# Patient Record
Sex: MALE | Race: WHITE
[De-identification: names, ages, dates, MRNs, and addresses within clinical notes are randomized per-mention and may not be internally consistent; named-entity substitution may affect disease eponyms.]

---

## 2017-02-13 ENCOUNTER — HOSPITAL ENCOUNTER (OUTPATIENT)
Dept: HOSPITAL 17 - CLAB | Age: 62
End: 2017-02-13
Attending: FAMILY MEDICINE
Payer: COMMERCIAL

## 2017-02-13 DIAGNOSIS — E03.9: Primary | ICD-10-CM

## 2017-02-13 PROCEDURE — 36415 COLL VENOUS BLD VENIPUNCTURE: CPT

## 2017-02-13 PROCEDURE — 84443 ASSAY THYROID STIM HORMONE: CPT

## 2017-05-16 ENCOUNTER — HOSPITAL ENCOUNTER (EMERGENCY)
Dept: HOSPITAL 17 - NEPK | Age: 62
Discharge: HOME | End: 2017-05-16
Payer: COMMERCIAL

## 2017-05-16 VITALS — BODY MASS INDEX: 24.97 KG/M2 | HEIGHT: 70.5 IN | WEIGHT: 176.37 LBS

## 2017-05-16 VITALS
RESPIRATION RATE: 16 BRPM | TEMPERATURE: 98.8 F | HEART RATE: 81 BPM | OXYGEN SATURATION: 97 % | SYSTOLIC BLOOD PRESSURE: 162 MMHG | DIASTOLIC BLOOD PRESSURE: 95 MMHG

## 2017-05-16 DIAGNOSIS — Z23: ICD-10-CM
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
## 2017-05-16 DIAGNOSIS — Z87.442: ICD-10-CM

## 2017-05-16 DIAGNOSIS — E07.9: ICD-10-CM

## 2017-05-16 DIAGNOSIS — Z72.0: ICD-10-CM

## 2017-05-16 DIAGNOSIS — S91.011A: Primary | ICD-10-CM

## 2017-05-16 PROCEDURE — 12001 RPR S/N/AX/GEN/TRNK 2.5CM/<: CPT

## 2017-05-16 PROCEDURE — 90714 TD VACC NO PRESV 7 YRS+ IM: CPT

## 2017-05-16 PROCEDURE — 90471 IMMUNIZATION ADMIN: CPT

## 2017-05-16 NOTE — PD
Physical Exam


Time Seen by Provider:  11:33


Narrative


61 y/o male here with laceration to R ankle sustained by a broken piece of a 

porcelain toilet.  C/o minimal pain associated with laceration.  Bandaged at 

triage desk.  Ambulatory.








Vital signs reviewed.


Seen at triage desk.  Awaiting bed placement.





Data


Data


Last Documented VS





Vital Signs








  Date Time  Temp Pulse Resp B/P Pulse Ox O2 Delivery O2 Flow Rate FiO2


 


5/16/17 11:31 98.8 81 16 162/95 97 Room Air  











[facility]


Medical Record Reviewed:  Yes


Supervised Visit with KEMAR:  Howard Gutierrez May 16, 2017 11:35

## 2017-05-16 NOTE — PD
HPI


Chief Complaint:  Laceration/Skin Injury


Time Seen by Provider:  11:45


Travel History


International Travel<30 days:  No


Contact w/Intl Traveler<30days:  No


Traveled to known affect area:  No





History of Present Illness


HPI


62-year-old male presents emergency Department with complaint of a laceration 

to the lateral aspect of his right ankle from a piece of porcelain today.  

Unknown tetanus vaccination status.  Denies paresthesias, loss of sensation, 

decreased motion, decreasing to the affected joint.  Has not taken any 

medication to alleviate symptoms.  Has applied pressure and a bandage to 

control bleeding.  Has no other medical complaints.  No other modifying factors 

or associated signs and symptoms.





[facility]


Past Medical History


Arthritis:  Yes


Cancer:  No


Cardiovascular Problems:  Yes


Diminished Hearing:  No


Endocrine:  Yes


Gastrointestinal Disorders:  No


Genitourinary:  Yes


Implanted Vascular Access Dvce:  No


Kidney Stones:  Yes


Musculoskeletal:  Yes


Neurologic:  Yes


Psychiatric:  No


Reproductive:  No


Respiratory:  No


Immunizations Current:  No


Thyroid Disease:  Yes


Tetanus Vaccination:  Unknown


Influenza Vaccination:  No





Past Surgical History


Abdominal Surgery:  Yes (APPENDECTOMY)


Appendectomy:  Yes


Other Surgery:  Yes





Social History


Alcohol Use:  Yes (WEEKLY, BEER)


Tobacco Use:  Yes (RARELY)


Substance Use:  No





Allergies-Medications


(Allergen,Severity, Reaction):  


Coded Allergies:  


     No Known Allergies (Unverified , 3/9/17)


Reported Meds & Prescriptions





Reported Meds & Active Scripts


Active


Amlodipine (Amlodipine Besylate) 10 Mg Tab 10 Mg PO DAILY


Levothyroxine (Levothyroxine Sodium) 25 Mcg Tab 25 Mcg PO DAILY


Atorvastatin (Atorvastatin Calcium) 40 Mg Tab 40 Mg PO HS


Levothyroxine (Levothyroxine Sodium) 200 Mcg Tab 200 Mcg PO DAILY








Review of Systems


Except as stated in HPI:  all other systems reviewed are Neg





Physical Exam


Narrative


GENERAL: Well-nourished, well-developed  male patient, in no acute 

distress


SKIN: Warm and dry.  Approximately 1-1/2; laceration to the lateral aspect of 

the right ankle; without erythema, edema; with minimal amount bright red 

drainage.  Right lower 20 supple and non-tense with 2+ pedal pulse and sensory 

intact without erythema or edema.


HEAD: Atraumatic. Normocephalic. 


EYES: Pupils equal and round. No scleral icterus. No injection or drainage.


ENT: Mucosa pink and moist.  Airway patent.  


NECK: Trachea midline.  


CARDIOVASCULAR: Regular rate. 


RESPIRATORY: No accessory muscle use.


GASTROINTESTINAL: Flat.  


MUSCULOSKELETAL:  No obvious deformities. No clubbing.  No cyanosis.  No edema. 


NEUROLOGICAL: Awake and alert.  Oriented 3.  No obvious cranial nerve 

deficits.  Motor grossly within normal limits. Normal speech. 


PSYCHIATRIC: Appropriate mood and affect; insight and judgment normal.





Data


Data


Last Documented VS





Vital Signs








  Date Time  Temp Pulse Resp B/P Pulse Ox O2 Delivery O2 Flow Rate FiO2


 


5/16/17 11:31 98.8 81 16 162/95 97 Room Air  








Orders





 Lidocai-Epi 1%-1:100,000 Inj (Xylocaine- (5/16/17 11:45)


Tetanus/Diphtheria Tox Adult (Tetanus/Di (5/16/17 12:00)








MDM


Medical Decision Making


Medical Screen Exam Complete:  Yes


Emergency Medical Condition:  Yes


Medical Record Reviewed:  Yes


Differential Diagnosis


Laceration, contusion, abrasion


Narrative Course


62-year-old male with laceration to his right ankle.  Medical records reviewed 

and do not find tetanus status.  Tetanus updated in the ER.  My procedure note 

for laceration repair.  Ibuprofen prescribed for home.  Patient verbalizes 

understanding and agreement with treatment plan.  Patient is medically cleared 

and stable for discharge.  Discussed reasons to return to the emergency 

department.  Instructed patient to follow up with primary care provider.  

Patient agrees with treatment plan.  The patients vital signs are stable and 

the patient is stable for outpatient follow-up and treatment.  Patient 

discharged home, stable and in no acute distress.





Procedures


**Procedure Narrative**


LACERATION


LOCATION: Right lateral ankle


LENGTH: 1-1/2 cm


NUMBER OF STITCHES/STAPLES: 2 staples





REPAIR: The area of the laceration was prepped with Betadine and sterilely 

draped.  The laceration was infiltrated with  


1% lidocaine with epinephrine.  The wound was copiously irrigated and explored 

without evidence of foreign body, tendon injury or neurovascular  


injury.  The wound was closed using staples. This was a single layer repair. A 

sterile dressing was applied. The patient was  


advised to keep the dressing clean and dry. Patient tolerated the procedure 

well.





Diagnosis





 Primary Impression:  


 Laceration of ankle


 Qualified Code:  S91.011A - Laceration of ankle, right, initial encounter


Referrals:  


Primary Care Physician


Patient Instructions:  Acute Wound Care (ED), General Instructions, Laceration (

ED), Staple Care (ED)


Departure Forms:  Tests/Procedures, Work Release   Enter return to work date:  

May 17, 2017





***Additional Instructions:


Keep area clean and dry


Ibuprofen or Tylenol as directed and as needed for pain and inflammation


Ice pack to area as needed to decrease pain


Return to the emergency department or follow-up with primary care provider in 10

-14 days for staple removal


Follow up with primary care provider 


Return to the emergency department immediately with worsening of symptoms, 

particularly if reddened streaks up or down the affected extremity from the 

suture site, fever, numbness/tingling in the affected extremity, loss of 

sensation in the affected extremity, severe swelling of the affected


***Med/Other Pt SpecificInfo:  Prescription(s) given


Disposition:  01 DISCHARGE HOME


Condition:  Stable








Keyla Salinas May 16, 2017 11:54

## 2017-06-09 ENCOUNTER — HOSPITAL ENCOUNTER (OUTPATIENT)
Dept: HOSPITAL 17 - CLAB | Age: 62
End: 2017-06-09
Attending: FAMILY MEDICINE
Payer: COMMERCIAL

## 2017-06-09 DIAGNOSIS — E03.9: Primary | ICD-10-CM

## 2017-06-09 DIAGNOSIS — E78.5: ICD-10-CM

## 2017-06-09 DIAGNOSIS — I10: ICD-10-CM

## 2017-06-09 DIAGNOSIS — Z72.0: ICD-10-CM

## 2017-06-09 LAB
ALP SERPL-CCNC: 84 U/L (ref 45–117)
ALT SERPL-CCNC: 24 U/L (ref 12–78)
ANION GAP SERPL CALC-SCNC: 9 MEQ/L (ref 5–15)
AST SERPL-CCNC: 22 U/L (ref 15–37)
BASOPHILS # BLD AUTO: 0.1 TH/MM3 (ref 0–0.2)
BASOPHILS NFR BLD: 1.1 % (ref 0–2)
BILIRUB SERPL-MCNC: 0.3 MG/DL (ref 0.2–1)
BUN SERPL-MCNC: 21 MG/DL (ref 7–18)
CHLORIDE SERPL-SCNC: 109 MEQ/L (ref 98–107)
EOSINOPHIL # BLD: 0.3 TH/MM3 (ref 0–0.4)
EOSINOPHIL NFR BLD: 4.6 % (ref 0–4)
ERYTHROCYTE [DISTWIDTH] IN BLOOD BY AUTOMATED COUNT: 14.6 % (ref 11.6–17.2)
GFR SERPLBLD BASED ON 1.73 SQ M-ARVRAT: 32 ML/MIN (ref 89–?)
HCO3 BLD-SCNC: 21.7 MEQ/L (ref 21–32)
HCT VFR BLD CALC: 36.9 % (ref 39–51)
HDLC SERPL-MCNC: 36.1 MG/DL (ref 40–60)
HEMO FLAGS: (no result)
LDLC SERPL-MCNC: 38 MG/DL (ref 0–99)
LYMPHOCYTES # BLD AUTO: 0.6 TH/MM3 (ref 1–4.8)
LYMPHOCYTES NFR BLD AUTO: 11.7 % (ref 9–44)
MCH RBC QN AUTO: 29 PG (ref 27–34)
MCHC RBC AUTO-ENTMCNC: 33.2 % (ref 32–36)
MCV RBC AUTO: 87.3 FL (ref 80–100)
MONOCYTES NFR BLD: 12.1 % (ref 0–8)
NEUTROPHILS # BLD AUTO: 3.9 TH/MM3 (ref 1.8–7.7)
NEUTROPHILS NFR BLD AUTO: 70.5 % (ref 16–70)
PLATELET # BLD: 190 TH/MM3 (ref 150–450)
POTASSIUM SERPL-SCNC: 4.1 MEQ/L (ref 3.5–5.1)
RBC # BLD AUTO: 4.22 MIL/MM3 (ref 4.5–5.9)
SODIUM SERPL-SCNC: 140 MEQ/L (ref 136–145)
WBC # BLD AUTO: 5.5 TH/MM3 (ref 4–11)

## 2017-06-09 PROCEDURE — 85025 COMPLETE CBC W/AUTO DIFF WBC: CPT

## 2017-06-09 PROCEDURE — 80061 LIPID PANEL: CPT

## 2017-06-09 PROCEDURE — 84443 ASSAY THYROID STIM HORMONE: CPT

## 2017-06-09 PROCEDURE — 36415 COLL VENOUS BLD VENIPUNCTURE: CPT

## 2017-06-09 PROCEDURE — 80053 COMPREHEN METABOLIC PANEL: CPT

## 2017-06-15 ENCOUNTER — HOSPITAL ENCOUNTER (OUTPATIENT)
Dept: HOSPITAL 17 - CLAB | Age: 62
End: 2017-06-15
Attending: FAMILY MEDICINE
Payer: COMMERCIAL

## 2017-06-15 DIAGNOSIS — N19: Primary | ICD-10-CM

## 2017-06-15 LAB
ANION GAP SERPL CALC-SCNC: 10 MEQ/L (ref 5–15)
BUN SERPL-MCNC: 11 MG/DL (ref 7–18)
CHLORIDE SERPL-SCNC: 105 MEQ/L (ref 98–107)
GFR SERPLBLD BASED ON 1.73 SQ M-ARVRAT: 83 ML/MIN (ref 89–?)
HCO3 BLD-SCNC: 23.9 MEQ/L (ref 21–32)
POTASSIUM SERPL-SCNC: 3.9 MEQ/L (ref 3.5–5.1)
SODIUM SERPL-SCNC: 139 MEQ/L (ref 136–145)

## 2017-06-15 PROCEDURE — 80069 RENAL FUNCTION PANEL: CPT

## 2017-06-15 PROCEDURE — 36415 COLL VENOUS BLD VENIPUNCTURE: CPT

## 2017-07-17 ENCOUNTER — HOSPITAL ENCOUNTER (OUTPATIENT)
Dept: HOSPITAL 17 - CLAB | Age: 62
End: 2017-07-17
Attending: FAMILY MEDICINE
Payer: COMMERCIAL

## 2017-07-17 DIAGNOSIS — E03.9: Primary | ICD-10-CM

## 2017-07-17 PROCEDURE — 36415 COLL VENOUS BLD VENIPUNCTURE: CPT

## 2017-07-17 PROCEDURE — 84443 ASSAY THYROID STIM HORMONE: CPT

## 2018-05-11 ENCOUNTER — HOSPITAL ENCOUNTER (EMERGENCY)
Dept: HOSPITAL 17 - NEDAMB | Age: 63
Discharge: HOME | End: 2018-05-11
Payer: COMMERCIAL

## 2018-05-11 ENCOUNTER — HOSPITAL ENCOUNTER (EMERGENCY)
Dept: HOSPITAL 17 - NEDAMB | Age: 63
Discharge: HOME | End: 2018-05-11
Payer: SELF-PAY

## 2018-05-11 VITALS
RESPIRATION RATE: 16 BRPM | HEART RATE: 68 BPM | TEMPERATURE: 97.8 F | OXYGEN SATURATION: 95 % | DIASTOLIC BLOOD PRESSURE: 76 MMHG | SYSTOLIC BLOOD PRESSURE: 143 MMHG

## 2018-05-11 VITALS — BODY MASS INDEX: 25.25 KG/M2 | WEIGHT: 176.37 LBS | HEIGHT: 70 IN

## 2018-05-11 VITALS — DIASTOLIC BLOOD PRESSURE: 75 MMHG | SYSTOLIC BLOOD PRESSURE: 142 MMHG

## 2018-05-11 VITALS
TEMPERATURE: 98.6 F | HEART RATE: 74 BPM | SYSTOLIC BLOOD PRESSURE: 160 MMHG | RESPIRATION RATE: 18 BRPM | DIASTOLIC BLOOD PRESSURE: 54 MMHG | OXYGEN SATURATION: 96 %

## 2018-05-11 DIAGNOSIS — M79.604: Primary | ICD-10-CM

## 2018-05-11 DIAGNOSIS — Z79.899: ICD-10-CM

## 2018-05-11 DIAGNOSIS — E03.9: ICD-10-CM

## 2018-05-11 PROCEDURE — 73590 X-RAY EXAM OF LOWER LEG: CPT

## 2018-05-11 PROCEDURE — 99285 EMERGENCY DEPT VISIT HI MDM: CPT

## 2018-05-11 PROCEDURE — 73630 X-RAY EXAM OF FOOT: CPT

## 2018-05-11 PROCEDURE — 93971 EXTREMITY STUDY: CPT

## 2018-05-11 PROCEDURE — 80307 DRUG TEST PRSMV CHEM ANLYZR: CPT

## 2018-05-11 PROCEDURE — 73564 X-RAY EXAM KNEE 4 OR MORE: CPT

## 2018-05-11 PROCEDURE — 99282 EMERGENCY DEPT VISIT SF MDM: CPT
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
## 2018-05-11 NOTE — RADRPT
EXAM DATE/TIME:  05/11/2018 12:50 

 

[facility] COMPARISON:     

No previous studies available for comparison.

 

                     

INDICATIONS :     

Swelling with pain and bruising.

                     

 

MEDICAL HISTORY :            

Prior fractures   

 

SURGICAL HISTORY :        

none

 

ENCOUNTER:     

Initial                                        

 

ACUITY:     

3 days      

 

PAIN SCORE:     

8/10

 

LOCATION:     

Right  tibia.

 

FINDINGS:     

Two view examination of the right tibia demonstrates no evidence of fracture or dislocation.  Bony mi
neralization is normal.  The soft tissue structures are intact.

 

CONCLUSION:     

Negative for fracture or radiopaque foreign body

 

 

 

 Rod Moreland MD FACR on May 11, 2018 at 13:49           

Board Certified Radiologist.

 This report was verified electronically.

## 2018-05-11 NOTE — RADRPT
EXAM DATE/TIME:  05/11/2018 13:01 

 

[facility] COMPARISON:     KNEE RIGHT COMPLETE (4VWS), October 05, 2015, 12:04.

 

                     

INDICATIONS :     Pain with swelling.

                     

MEDICAL HISTORY :     None.          

SURGICAL HISTORY :     None.   

ENCOUNTER:     Initial                                        

ACUITY:     3 days      

PAIN SCORE:     9/10

LOCATION:     Right  knee.

 

FINDINGS:     Degenerative changes are noted on the medial side.  Articular calcifications are eviden
t.  Fracture is not appreciated.  Trace joint effusion is evident.

CONCLUSION:     Degenerative changes with trace joint effusion.  Fracture not appreciated.

 

Rod Moreland MD FACR on May 11, 2018 at 13:47           

Board Certified Radiologist.

 This report was verified electronically.
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
## 2018-05-11 NOTE — RADRPT
EXAM DATE/TIME:  05/11/2018 12:58 

 

[facility] COMPARISON:     

No previous studies available for comparison.

 

                     

INDICATIONS :     

Pain with swelling, prior fractures.

                     

 

MEDICAL HISTORY :            

Prior fracture    

 

SURGICAL HISTORY :     

None.   

 

ENCOUNTER:     

Initial                                        

 

ACUITY:     

3 days      

 

PAIN SCORE:     

9/10

 

LOCATION:     

Right  foot

 

FINDINGS:     

Three view examination of the right foot demonstrates no soft tissue swelling, dislocation, or fractu
re.   The tarsal bones appear intact.  The interphalangeal and metatarsophalangeal joints are intact.
  The calcaneus is intact.  Bony mineralization is normal.

 

CONCLUSION:     Negative fracture 

 

 

 Rod Moreland MD FACR on May 11, 2018 at 13:48           

Board Certified Radiologist.

 This report was verified electronically.